# Patient Record
(demographics unavailable — no encounter records)

---

## 2024-10-29 NOTE — REASON FOR VISIT
[Other Location: e.g. School (Enter Location, City,State)___] : at [unfilled], at the time of the visit. [place] [place]  [Patient] : the patient [Follow-Up: _____] : a [unfilled] follow-up visit

## 2024-10-29 NOTE — DISCUSSION/SUMMARY
[FreeTextEntry1] : 40-year-old right-handed history of attention deficit disorder, doing well on present therapy, tolerating and good response to Adderall, ER 20 mg once a day. Reviewed and discussed treatment, no changes at this time. Patient will call for refills as needed. Return to office, 6 months.

## 2024-10-29 NOTE — PHYSICAL EXAM
[General Appearance - Alert] : alert [General Appearance - In No Acute Distress] : in no acute distress [General Appearance - Well Nourished] : well nourished [General Appearance - Well Developed] : well developed [General Appearance - Well-Appearing] : healthy appearing [] : normal voice and communication [Oriented To Time, Place, And Person] : oriented to person, place, and time [Affect] : the affect was normal [Impaired Insight] : insight and judgment were intact [Mood] : the mood was normal [Memory Recent] : recent memory was not impaired [Over the Past 2 Weeks, Have You Felt Down, Depressed, or Hopeless?] : 1.) Over the past 2 weeks, have you felt down, depressed, or hopeless? No [Memory Remote] : remote memory was not impaired [Over the Past 2 Weeks, Have You Felt Little Interest or Pleasure Doing Things?] : 2.) Over the past 2 weeks, have you felt little interest or pleasure doing things? No

## 2024-10-29 NOTE — HISTORY OF PRESENT ILLNESS
[FreeTextEntry1] : 40-year-old woman right-handed with a history of attention deficit disorder, for follow-up visit.  Last time she was in the office April of this year. Reports doing well, no new medical issues or complaints, recently celebrated her 40th birthday. Prescribe Adderall extended release 20 mg once a day with good results.  Helps her focus, more productive.  Avoid distractions. No reported side effect, no trouble with headache, chest pain, palpitation, no trouble sleeping, no mood changes.

## 2025-04-10 NOTE — DISCUSSION/SUMMARY
[FreeTextEntry1] : 40-year-old right-handed woman with a history of attention deficit disorder, has been doing fairly well on Adderall extended release 20 mg daily, has noted some attenuation of the affected dose effect  Reviewed and discussed options, Will try increasing the dose to Adderall extended release 25 mg p.o. every morning. Patient will give me a call back within a month to let me know how that is working.  If she continues on the present dose we will follow-up in 4 to 6 months.

## 2025-04-10 NOTE — DATA REVIEWED
I have reviewed discharge instructions with the patient. The patient verbalized understanding. Patient left ED via Discharge Method: ambulatory to Home with friend    Opportunity for questions and clarification provided. Patient given 2 scripts. To continue your aftercare when you leave the hospital, you may receive an automated call from our care team to check in on how you are doing. This is a free service and part of our promise to provide the best care and service to meet your aftercare needs.  If you have questions, or wish to unsubscribe from this service please call [phone]. Thank you for Choosing our [address] [place] [address] Emergency Department. [No studies available for review at this time.] : No studies available for review at this time.

## 2025-04-10 NOTE — REASON FOR VISIT
[Other Location: e.g. School (Enter Location, City,State)___] : at [unfilled], at the time of the visit. [Medical Office: ([facility])___] : at the medical office located in  [Verbal consent obtained from patient] : the patient, [unfilled]

## 2025-04-10 NOTE — HISTORY OF PRESENT ILLNESS
[FreeTextEntry1] : 40-year-old woman right-handed with a history of attention deficit disorder, for follow-up visit, last time seen in the office October 2024.  Reports been doing well, she is prescribed Adderall extended release 20 mg in the morning usually to good effect, although over time she has noted the effect waned and does not seem as robust as in the past.  She is wondering if she can try higher dose. So far tolerating the medication without any complaints, no difficulty with sleep, no headache, no chest pain palpitation. She has been monitoring her blood pressure more carefully she is following up with her primary care physician so far when she does take at home seems to be within the normal ranges.